# Patient Record
Sex: MALE | Race: BLACK OR AFRICAN AMERICAN | NOT HISPANIC OR LATINO | Employment: FULL TIME | URBAN - METROPOLITAN AREA
[De-identification: names, ages, dates, MRNs, and addresses within clinical notes are randomized per-mention and may not be internally consistent; named-entity substitution may affect disease eponyms.]

---

## 2017-12-10 ENCOUNTER — HOSPITAL ENCOUNTER (EMERGENCY)
Facility: HOSPITAL | Age: 42
Discharge: HOME | End: 2017-12-10
Attending: EMERGENCY MEDICINE | Admitting: EMERGENCY MEDICINE

## 2017-12-10 VITALS
HEART RATE: 58 BPM | HEIGHT: 69 IN | SYSTOLIC BLOOD PRESSURE: 119 MMHG | BODY MASS INDEX: 28.58 KG/M2 | TEMPERATURE: 97.8 F | OXYGEN SATURATION: 94 % | WEIGHT: 193 LBS | DIASTOLIC BLOOD PRESSURE: 80 MMHG | RESPIRATION RATE: 18 BRPM

## 2017-12-10 DIAGNOSIS — R11.2 NAUSEA AND VOMITING, INTRACTABILITY OF VOMITING NOT SPECIFIED, UNSPECIFIED VOMITING TYPE: Primary | ICD-10-CM

## 2017-12-10 LAB
ALBUMIN SERPL-MCNC: 4.1 G/DL (ref 3.5–5.2)
ALBUMIN/GLOB SERPL: 1.3 G/DL
ALP SERPL-CCNC: 51 U/L (ref 39–117)
ALT SERPL W P-5'-P-CCNC: 23 U/L (ref 1–41)
ANION GAP SERPL CALCULATED.3IONS-SCNC: 10.8 MMOL/L
AST SERPL-CCNC: 16 U/L (ref 1–40)
BACTERIA UR QL AUTO: NORMAL /HPF
BASOPHILS # BLD AUTO: 0.04 10*3/MM3 (ref 0–0.2)
BASOPHILS NFR BLD AUTO: 0.5 % (ref 0–1.5)
BILIRUB SERPL-MCNC: 0.8 MG/DL (ref 0.1–1.2)
BILIRUB UR QL STRIP: NEGATIVE
BUN BLD-MCNC: 12 MG/DL (ref 6–20)
BUN/CREAT SERPL: 8.8 (ref 7–25)
CALCIUM SPEC-SCNC: 9.2 MG/DL (ref 8.6–10.5)
CHLORIDE SERPL-SCNC: 106 MMOL/L (ref 98–107)
CLARITY UR: CLEAR
CO2 SERPL-SCNC: 26.2 MMOL/L (ref 22–29)
COLOR UR: YELLOW
CREAT BLD-MCNC: 1.36 MG/DL (ref 0.76–1.27)
DEPRECATED RDW RBC AUTO: 40.1 FL (ref 37–54)
EOSINOPHIL # BLD AUTO: 0.19 10*3/MM3 (ref 0–0.7)
EOSINOPHIL NFR BLD AUTO: 2.6 % (ref 0.3–6.2)
ERYTHROCYTE [DISTWIDTH] IN BLOOD BY AUTOMATED COUNT: 12.6 % (ref 11.5–14.5)
GFR SERPL CREATININE-BSD FRML MDRD: 57 ML/MIN/1.73
GFR SERPL CREATININE-BSD FRML MDRD: 69 ML/MIN/1.73
GLOBULIN UR ELPH-MCNC: 3.2 GM/DL
GLUCOSE BLD-MCNC: 109 MG/DL (ref 65–99)
GLUCOSE UR STRIP-MCNC: NEGATIVE MG/DL
HCT VFR BLD AUTO: 40.9 % (ref 40.4–52.2)
HGB BLD-MCNC: 14.3 G/DL (ref 13.7–17.6)
HGB UR QL STRIP.AUTO: ABNORMAL
HOLD SPECIMEN: NORMAL
HOLD SPECIMEN: NORMAL
HYALINE CASTS UR QL AUTO: NORMAL /LPF
IMM GRANULOCYTES # BLD: 0 10*3/MM3 (ref 0–0.03)
IMM GRANULOCYTES NFR BLD: 0 % (ref 0–0.5)
KETONES UR QL STRIP: NEGATIVE
LEUKOCYTE ESTERASE UR QL STRIP.AUTO: NEGATIVE
LIPASE SERPL-CCNC: 21 U/L (ref 13–60)
LYMPHOCYTES # BLD AUTO: 2.45 10*3/MM3 (ref 0.9–4.8)
LYMPHOCYTES NFR BLD AUTO: 33.2 % (ref 19.6–45.3)
MCH RBC QN AUTO: 30.9 PG (ref 27–32.7)
MCHC RBC AUTO-ENTMCNC: 35 G/DL (ref 32.6–36.4)
MCV RBC AUTO: 88.3 FL (ref 79.8–96.2)
MONOCYTES # BLD AUTO: 0.74 10*3/MM3 (ref 0.2–1.2)
MONOCYTES NFR BLD AUTO: 10 % (ref 5–12)
NEUTROPHILS # BLD AUTO: 3.96 10*3/MM3 (ref 1.9–8.1)
NEUTROPHILS NFR BLD AUTO: 53.7 % (ref 42.7–76)
NITRITE UR QL STRIP: NEGATIVE
PH UR STRIP.AUTO: 5.5 [PH] (ref 5–8)
PLATELET # BLD AUTO: 254 10*3/MM3 (ref 140–500)
PMV BLD AUTO: 11.3 FL (ref 6–12)
POTASSIUM BLD-SCNC: 4.2 MMOL/L (ref 3.5–5.2)
PROT SERPL-MCNC: 7.3 G/DL (ref 6–8.5)
PROT UR QL STRIP: NEGATIVE
RBC # BLD AUTO: 4.63 10*6/MM3 (ref 4.6–6)
RBC # UR: NORMAL /HPF
REF LAB TEST METHOD: NORMAL
SODIUM BLD-SCNC: 143 MMOL/L (ref 136–145)
SP GR UR STRIP: 1.02 (ref 1–1.03)
SQUAMOUS #/AREA URNS HPF: NORMAL /HPF
UROBILINOGEN UR QL STRIP: ABNORMAL
WBC NRBC COR # BLD: 7.38 10*3/MM3 (ref 4.5–10.7)
WBC UR QL AUTO: NORMAL /HPF
WHOLE BLOOD HOLD SPECIMEN: NORMAL
WHOLE BLOOD HOLD SPECIMEN: NORMAL

## 2017-12-10 PROCEDURE — 85025 COMPLETE CBC W/AUTO DIFF WBC: CPT | Performed by: EMERGENCY MEDICINE

## 2017-12-10 PROCEDURE — 81003 URINALYSIS AUTO W/O SCOPE: CPT | Performed by: EMERGENCY MEDICINE

## 2017-12-10 PROCEDURE — 81015 MICROSCOPIC EXAM OF URINE: CPT | Performed by: EMERGENCY MEDICINE

## 2017-12-10 PROCEDURE — 83690 ASSAY OF LIPASE: CPT | Performed by: EMERGENCY MEDICINE

## 2017-12-10 PROCEDURE — 36415 COLL VENOUS BLD VENIPUNCTURE: CPT | Performed by: EMERGENCY MEDICINE

## 2017-12-10 PROCEDURE — 80053 COMPREHEN METABOLIC PANEL: CPT | Performed by: EMERGENCY MEDICINE

## 2017-12-10 PROCEDURE — 99284 EMERGENCY DEPT VISIT MOD MDM: CPT

## 2017-12-10 RX ORDER — SODIUM CHLORIDE 0.9 % (FLUSH) 0.9 %
10 SYRINGE (ML) INJECTION AS NEEDED
Status: DISCONTINUED | OUTPATIENT
Start: 2017-12-10 | End: 2017-12-11 | Stop reason: HOSPADM

## 2017-12-10 RX ORDER — RANITIDINE 150 MG/1
150 TABLET ORAL 2 TIMES DAILY
COMMUNITY
End: 2017-12-18 | Stop reason: ALTCHOICE

## 2017-12-10 RX ORDER — ONDANSETRON 4 MG/1
4 TABLET, ORALLY DISINTEGRATING ORAL EVERY 6 HOURS PRN
Qty: 20 TABLET | Refills: 0 | Status: SHIPPED | OUTPATIENT
Start: 2017-12-10

## 2017-12-10 RX ORDER — ONDANSETRON 4 MG/1
4 TABLET, ORALLY DISINTEGRATING ORAL ONCE
Status: COMPLETED | OUTPATIENT
Start: 2017-12-10 | End: 2017-12-10

## 2017-12-10 RX ADMIN — ONDANSETRON 4 MG: 4 TABLET, ORALLY DISINTEGRATING ORAL at 21:24

## 2017-12-11 NOTE — ED PROVIDER NOTES
EMERGENCY DEPARTMENT ENCOUNTER    CHIEF COMPLAINT  Chief Complaint: vomiting  History given by: patient  History limited by: nothing  Room Number: 12/12  PMD: Provider Not In System      HPI:  Pt is a 42 y.o. male with a h/x of GI issues, who presents complaining of vomiting that began three days ago. He then developed diarrhea. Pt was admitted about one year ago at  for the same symptoms but they could not tell him what the problem was.  Says he has had problems with his stomach for a while.   Pt also c/o of intermittent headaches that are not correlated to his vomiting. Pt denies fever, chest pain, and abdominal pain.    Duration:  Three days  Onset: gradual  Timing: constant  Location: n/a  Radiation: n/a  Quality: n/a  Intensity/Severity: moderate  Progression: unchanged  Associated Symptoms: diarrhea, headaches  Aggravating Factors: none  Alleviating Factors: none  Previous Episodes: Pt was admitted to the hospital for similar symptoms one year ago.  Treatment before arrival: none    PAST MEDICAL HISTORY  Active Ambulatory Problems     Diagnosis Date Noted   • No Active Ambulatory Problems     Resolved Ambulatory Problems     Diagnosis Date Noted   • No Resolved Ambulatory Problems     Past Medical History:   Diagnosis Date   • Vitamin D deficiency disease        PAST SURGICAL HISTORY  Past Surgical History:   Procedure Laterality Date   • COLONOSCOPY     • HERNIA REPAIR         FAMILY HISTORY  History reviewed. No pertinent family history.    SOCIAL HISTORY  Social History     Social History   • Marital status: Single     Spouse name: N/A   • Number of children: N/A   • Years of education: N/A     Occupational History   • Not on file.     Social History Main Topics   • Smoking status: Never Smoker   • Smokeless tobacco: Never Used   • Alcohol use Yes      Comment: occasionally   • Drug use: No   • Sexual activity: Defer     Other Topics Concern   • Not on file     Social History Narrative   • No narrative  on file       ALLERGIES  Review of patient's allergies indicates no known allergies.    REVIEW OF SYSTEMS  Review of Systems   Constitutional: Negative for activity change, appetite change and fever.   HENT: Negative for congestion and sore throat.    Eyes: Negative.    Respiratory: Negative for cough and shortness of breath.    Cardiovascular: Negative for chest pain and leg swelling.   Gastrointestinal: Positive for diarrhea, nausea and vomiting. Negative for abdominal pain.   Endocrine: Negative.    Genitourinary: Negative for decreased urine volume and dysuria.   Musculoskeletal: Negative for neck pain.   Skin: Negative for rash and wound.   Allergic/Immunologic: Negative.    Neurological: Positive for headaches (intermittent). Negative for weakness and numbness.   Hematological: Negative.    Psychiatric/Behavioral: Negative.    All other systems reviewed and are negative.      PHYSICAL EXAM  ED Triage Vitals   Temp Heart Rate Resp BP SpO2   12/10/17 1904 12/10/17 1903 12/10/17 1903 12/10/17 1903 12/10/17 1903   97.8 °F (36.6 °C) 55 17 159/100 98 %      Temp src Heart Rate Source Patient Position BP Location FiO2 (%)   12/10/17 1904 12/10/17 1903 12/10/17 1903 12/10/17 1903 --   Tympanic Monitor Sitting Right arm        Physical Exam   Constitutional: He is oriented to person, place, and time and well-developed, well-nourished, and in no distress.   HENT:   Head: Normocephalic and atraumatic.   Eyes: EOM are normal. Pupils are equal, round, and reactive to light.   Neck: Normal range of motion. Neck supple.   Cardiovascular: Normal rate, regular rhythm and normal heart sounds.    Pulmonary/Chest: Effort normal and breath sounds normal. No respiratory distress.   Abdominal: Soft. There is no tenderness. There is no rebound and no guarding.   Musculoskeletal: Normal range of motion. He exhibits no edema.   Neurological: He is alert and oriented to person, place, and time. He has normal sensation and normal  strength.   Skin: Skin is warm and dry.   Psychiatric: Mood and affect normal.   Nursing note and vitals reviewed.      LAB RESULTS  Lab Results (last 24 hours)     Procedure Component Value Units Date/Time    CBC & Differential [[id number]] Collected:  12/10/17 1911    Specimen:  Blood Updated:  12/10/17 1931    Narrative:       The following orders were created for panel order CBC & Differential.  Procedure                               Abnormality         Status                     ---------                               -----------         ------                     CBC Auto Differential[[id number]]        Normal              Final result                 Please view results for these tests on the individual orders.    Comprehensive Metabolic Panel [[id number]]  (Abnormal) Collected:  12/10/17 1911    Specimen:  Blood Updated:  12/10/17 1944     Glucose 109 (H) mg/dL      BUN 12 mg/dL      Creatinine 1.36 (H) mg/dL      Sodium 143 mmol/L      Potassium 4.2 mmol/L      Chloride 106 mmol/L      CO2 26.2 mmol/L      Calcium 9.2 mg/dL      Total Protein 7.3 g/dL      Albumin 4.10 g/dL      ALT (SGPT) 23 U/L      AST (SGOT) 16 U/L      Alkaline Phosphatase 51 U/L      Total Bilirubin 0.8 mg/dL      eGFR Non African Amer 57 (L) mL/min/1.73      eGFR  African Amer 69 mL/min/1.73      Globulin 3.2 gm/dL      A/G Ratio 1.3 g/dL      BUN/Creatinine Ratio 8.8     Anion Gap 10.8 mmol/L     Lipase [[id number]]  (Normal) Collected:  12/10/17 1911    Specimen:  Blood Updated:  12/10/17 1944     Lipase 21 U/L     CBC Auto Differential [[id number]]  (Normal) Collected:  12/10/17 1911    Specimen:  Blood Updated:  12/10/17 1931     WBC 7.38 10*3/mm3      RBC 4.63 10*6/mm3      Hemoglobin 14.3 g/dL      Hematocrit 40.9 %      MCV 88.3 fL      MCH 30.9 pg      MCHC 35.0 g/dL      RDW 12.6 %      RDW-SD 40.1 fl      MPV 11.3 fL      Platelets 254 10*3/mm3      Neutrophil % 53.7 %      Lymphocyte % 33.2 %      Monocyte % 10.0 %       Eosinophil % 2.6 %      Basophil % 0.5 %      Immature Grans % 0.0 %      Neutrophils, Absolute 3.96 10*3/mm3      Lymphocytes, Absolute 2.45 10*3/mm3      Monocytes, Absolute 0.74 10*3/mm3      Eosinophils, Absolute 0.19 10*3/mm3      Basophils, Absolute 0.04 10*3/mm3      Immature Grans, Absolute 0.00 10*3/mm3     Urinalysis With / Culture If Indicated - Urine, Clean Catch [[id number]]  (Abnormal) Collected:  12/10/17 1958    Specimen:  Urine from Urine, Clean Catch Updated:  12/10/17 2014     Color, UA Yellow     Appearance, UA Clear     pH, UA 5.5     Specific Gravity, UA 1.025     Glucose, UA Negative     Ketones, UA Negative     Bilirubin, UA Negative     Blood, UA Trace (A)     Protein, UA Negative     Leuk Esterase, UA Negative     Nitrite, UA Negative     Urobilinogen, UA 0.2 E.U./dL    Urinalysis, Microscopic Only - Urine, Clean Catch [[id number]] Collected:  12/10/17 1958    Specimen:  Urine from Urine, Clean Catch Updated:  12/10/17 2015     RBC, UA 0-2 /HPF      WBC, UA 0-2 /HPF      Bacteria, UA None Seen /HPF      Squamous Epithelial Cells, UA 0-2 /HPF      Hyaline Casts, UA 0-2 /LPF      Methodology Automated Microscopy          I ordered the above labs and reviewed the results      PROCEDURES  Procedures      PROGRESS AND CONSULTS  ED Course     1907  Ordered labs for further evaluation.     2124  Ordered Zofran for nausea.    2156  Rechecked the patient and he is resting comfortably. Discussed the patient's pertinent labs and imaging results, including unremarkable labs. Discussed plan to discharge the patient home with a prx for anti-emetics and recommended follow up with a GI physician as soon as possible. Patient agrees with the plan and all questions were addressed.     Latest vital signs   BP- 134/85 HR- 52 Temp- 97.8 °F (36.6 °C) (Tympanic) O2 sat- 95%      MEDICAL DECISION MAKING  Results were reviewed/discussed with the patient and they were also made aware of online access. Pt also made  aware that some labs, such as cultures, will not be resulted during ER visit and follow up with PMD is necessary.     MDM  Number of Diagnoses or Management Options     Amount and/or Complexity of Data Reviewed  Clinical lab tests: ordered and reviewed (Labs are unremarkable.)           DIAGNOSIS  Final diagnoses:   Nausea and vomiting, intractability of vomiting not specified, unspecified vomiting type       DISPOSITION  DISCHARGE    Patient discharged in stable condition.    Reviewed implications of results, diagnosis, meds, responsibility to follow up, warning signs and symptoms of possible worsening, potential complications and reasons to return to ER, including new or worsening symptoms.    Patient/Family voiced understanding of above instructions.    Discussed plan for discharge, as there is no emergent indication for admission.  Pt/family is agreeable and understands need for follow up and repeat testing.  Pt is aware that discharge does not mean that nothing is wrong but it indicates no emergency is present that requires admission and they must continue care with follow-up as given below or physician of their choice.     FOLLOW-UP  [facility] REFERRAL SERVICE  [place] [address]  [phone]  Schedule an appointment as soon as possible for a visit      Candice Kelsey MD  [facility] [place] [address]  [phone]    Schedule an appointment as soon as possible for a visit           Medication List      New Prescriptions          ondansetron ODT 4 MG disintegrating tablet   Commonly known as:  ZOFRAN-ODT   Take 1 tablet by mouth Every 6 (Six) Hours As Needed for Nausea or   Vomiting.               Latest Documented Vital Signs:  As of 9:59 PM  BP- 134/85 HR- 52 Temp- 97.8 °F (36.6 °C) (Tympanic) O2 sat- 95%    --  Documentation assistance provided by bijal Bennett for Dr. Nugent.  Information recorded by the bijal was done at my direction and has been  verified and validated by me.         Adela Bennett  12/10/17 7430       Arben Nugent MD  12/11/17 3405

## 2017-12-18 ENCOUNTER — OFFICE VISIT (OUTPATIENT)
Dept: GASTROENTEROLOGY | Facility: CLINIC | Age: 42
End: 2017-12-18

## 2017-12-18 ENCOUNTER — TELEPHONE (OUTPATIENT)
Dept: GASTROENTEROLOGY | Facility: CLINIC | Age: 42
End: 2017-12-18

## 2017-12-18 VITALS — HEIGHT: 69 IN | WEIGHT: 204 LBS | BODY MASS INDEX: 30.21 KG/M2

## 2017-12-18 DIAGNOSIS — R68.81 EARLY SATIETY: ICD-10-CM

## 2017-12-18 DIAGNOSIS — R19.7 DIARRHEA OF PRESUMED INFECTIOUS ORIGIN: ICD-10-CM

## 2017-12-18 DIAGNOSIS — R10.13 EPIGASTRIC PAIN: Primary | ICD-10-CM

## 2017-12-18 DIAGNOSIS — R11.2 NON-INTRACTABLE VOMITING WITH NAUSEA, UNSPECIFIED VOMITING TYPE: ICD-10-CM

## 2017-12-18 PROCEDURE — 99204 OFFICE O/P NEW MOD 45 MIN: CPT | Performed by: INTERNAL MEDICINE

## 2017-12-18 RX ORDER — FINASTERIDE 5 MG/1
TABLET, FILM COATED ORAL
Refills: 5 | COMMUNITY
Start: 2017-12-12

## 2017-12-18 RX ORDER — PANTOPRAZOLE SODIUM 40 MG/1
40 TABLET, DELAYED RELEASE ORAL
Qty: 30 TABLET | Refills: 2 | Status: SHIPPED | OUTPATIENT
Start: 2017-12-18

## 2017-12-18 NOTE — PROGRESS NOTES
"Chief Complaint   Patient presents with   • Diarrhea     er visit told him to follow up w/ GI   • Vomiting       Subjective     HPI    Frankie Jerez is a 42 y.o. male with a past medical history noted below who presents for evaluation of vomiting, abdominal pain,  and diarrhea.  Symptoms have been intermittent but present for 3-4 years.  Most recent episode was last week--  He went to the ER 12/10.  He was having persistent nausea with \"forceful vomiting\" for about 3 days.  This was associated with diarrhea, which was a new change.  He has pain in his epigastric region that doubles him over-- feels like he needs to burp but can't.  He tries to \"push\" the air out.  He has been on zofran and zantac.  He felt that the zantac initially helped but doesn't anymore.   Vomiting has resolved but he persists with feeling overly full.  He says that he eats a small amount it makes him feel very full when he used to be able to eat more.  He is not having any associated vomiting since the initial episode.  The diarrhea has resolved as well.  He has not lost any weight.    He finds that corn makes his symptoms worse but that has been the only food trigger that he has been able to identify and he does avoid this.  His last episode was in September but reportedly not as bad.     He was hospitalized at  in 2015 for similar symptoms. He reports that he had CT scans, an EGD and colonoscopy but did not get any diagnosis he can recall..  He says they put him on a clear liquid diet did not see any polyps on his colonoscopy nor any abnormality on his EGD.    No family history of similar symptoms, no GI malignancy in his family.  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  No smoking.  Social ETOH.  No marijuana use.  Excess NSAIDs.  He does report that he is getting headaches and has been taking some Tylenol.    Labs reviewed from the December 10 ER visit show normal CBC, and elevated creatinine at 1.3.        Past Medical History: "   Diagnosis Date   • Vitamin D deficiency disease          Current Outpatient Prescriptions:   •  Cholecalciferol (VITAMIN D3) 5000 units capsule capsule, Take 5,000 Units by mouth 1 (One) Time Per Week., Disp: , Rfl:   •  finasteride (PROSCAR) 5 MG tablet, , Disp: , Rfl: 5  •  ondansetron ODT (ZOFRAN-ODT) 4 MG disintegrating tablet, Take 1 tablet by mouth Every 6 (Six) Hours As Needed for Nausea or Vomiting., Disp: 20 tablet, Rfl: 0  •  pantoprazole (PROTONIX) 40 MG EC tablet, Take 1 tablet by mouth Every Morning Before Breakfast., Disp: 30 tablet, Rfl: 2    No Known Allergies    Social History     Social History   • Marital status: Single     Spouse name: N/A   • Number of children: N/A   • Years of education: N/A     Occupational History   • Not on file.     Social History Main Topics   • Smoking status: Never Smoker   • Smokeless tobacco: Never Used   • Alcohol use Yes      Comment: occasionally   • Drug use: No   • Sexual activity: Defer     Other Topics Concern   • Not on file     Social History Narrative       History reviewed. No pertinent family history.    Review of Systems   Constitutional: Negative for activity change, appetite change and fatigue.   HENT: Negative for sore throat and trouble swallowing.    Respiratory: Negative.    Cardiovascular: Negative.    Gastrointestinal: Positive for abdominal pain, diarrhea and nausea. Negative for abdominal distention and blood in stool.   Endocrine: Negative for cold intolerance and heat intolerance.   Genitourinary: Negative for difficulty urinating, dysuria and frequency.   Musculoskeletal: Negative for arthralgias, back pain and myalgias.   Skin: Negative.    Hematological: Negative for adenopathy. Does not bruise/bleed easily.   All other systems reviewed and are negative.      Objective     There were no vitals filed for this visit.  Last 2 weights    12/18/17  0956   Weight: 92.5 kg (204 lb)     Body mass index is 30.13 kg/(m^2).    Physical Exam    Constitutional: He is oriented to person, place, and time. He appears well-developed and well-nourished. No distress.   HENT:   Head: Normocephalic and atraumatic.   Right Ear: External ear normal.   Left Ear: External ear normal.   Nose: Nose normal.   Mouth/Throat: Oropharynx is clear and moist.   Eyes: Conjunctivae and EOM are normal. Right eye exhibits no discharge. Left eye exhibits no discharge. No scleral icterus.   Neck: Normal range of motion. Neck supple. No thyromegaly present.   No supraclavicular adenopathy   Cardiovascular: Normal rate, regular rhythm, normal heart sounds and intact distal pulses.  Exam reveals no gallop.    No murmur heard.  No lower extremity edema   Pulmonary/Chest: Effort normal and breath sounds normal. No respiratory distress. He has no wheezes.   Abdominal: Soft. Normal appearance and bowel sounds are normal. He exhibits no distension and no mass. There is no hepatosplenomegaly. There is tenderness. There is no rigidity, no rebound and no guarding.   TTP epigastrium   Genitourinary:   Genitourinary Comments: Rectal exam deferred   Musculoskeletal: Normal range of motion. He exhibits no edema or tenderness.   No atrophy of upper or lower extremities.  Normal digits and nails of both hands.   Lymphadenopathy:     He has no cervical adenopathy.   Neurological: He is alert and oriented to person, place, and time. He displays no atrophy. Coordination normal.   Skin: Skin is warm and dry. No rash noted. He is not diaphoretic. No erythema.   Psychiatric: He has a normal mood and affect. His behavior is normal. Judgment and thought content normal.   Vitals reviewed.      WBC   Date Value Ref Range Status   12/10/2017 7.38 4.50 - 10.70 10*3/mm3 Final     RBC   Date Value Ref Range Status   12/10/2017 4.63 4.60 - 6.00 10*6/mm3 Final     Hemoglobin   Date Value Ref Range Status   12/10/2017 14.3 13.7 - 17.6 g/dL Final     Hematocrit   Date Value Ref Range Status   12/10/2017 40.9 40.4 -  52.2 % Final     MCV   Date Value Ref Range Status   12/10/2017 88.3 79.8 - 96.2 fL Final     MCH   Date Value Ref Range Status   12/10/2017 30.9 27.0 - 32.7 pg Final     MCHC   Date Value Ref Range Status   12/10/2017 35.0 32.6 - 36.4 g/dL Final     RDW   Date Value Ref Range Status   12/10/2017 12.6 11.5 - 14.5 % Final     RDW-SD   Date Value Ref Range Status   12/10/2017 40.1 37.0 - 54.0 fl Final     MPV   Date Value Ref Range Status   12/10/2017 11.3 6.0 - 12.0 fL Final     Platelets   Date Value Ref Range Status   12/10/2017 254 140 - 500 10*3/mm3 Final     Neutrophil %   Date Value Ref Range Status   12/10/2017 53.7 42.7 - 76.0 % Final     Lymphocyte %   Date Value Ref Range Status   12/10/2017 33.2 19.6 - 45.3 % Final     Monocyte %   Date Value Ref Range Status   12/10/2017 10.0 5.0 - 12.0 % Final     Eosinophil %   Date Value Ref Range Status   12/10/2017 2.6 0.3 - 6.2 % Final     Basophil %   Date Value Ref Range Status   12/10/2017 0.5 0.0 - 1.5 % Final     Immature Grans %   Date Value Ref Range Status   12/10/2017 0.0 0.0 - 0.5 % Final     Neutrophils, Absolute   Date Value Ref Range Status   12/10/2017 3.96 1.90 - 8.10 10*3/mm3 Final     Lymphocytes, Absolute   Date Value Ref Range Status   12/10/2017 2.45 0.90 - 4.80 10*3/mm3 Final     Monocytes, Absolute   Date Value Ref Range Status   12/10/2017 0.74 0.20 - 1.20 10*3/mm3 Final     Eosinophils, Absolute   Date Value Ref Range Status   12/10/2017 0.19 0.00 - 0.70 10*3/mm3 Final     Basophils, Absolute   Date Value Ref Range Status   12/10/2017 0.04 0.00 - 0.20 10*3/mm3 Final     Immature Grans, Absolute   Date Value Ref Range Status   12/10/2017 0.00 0.00 - 0.03 10*3/mm3 Final       Glucose   Date Value Ref Range Status   12/10/2017 109 (H) 65 - 99 mg/dL Final     Sodium   Date Value Ref Range Status   12/10/2017 143 136 - 145 mmol/L Final     Potassium   Date Value Ref Range Status   12/10/2017 4.2 3.5 - 5.2 mmol/L Final     CO2   Date Value Ref  Range Status   12/10/2017 26.2 22.0 - 29.0 mmol/L Final     Chloride   Date Value Ref Range Status   12/10/2017 106 98 - 107 mmol/L Final     Anion Gap   Date Value Ref Range Status   12/10/2017 10.8 mmol/L Final     Creatinine   Date Value Ref Range Status   12/10/2017 1.36 (H) 0.76 - 1.27 mg/dL Final     BUN   Date Value Ref Range Status   12/10/2017 12 6 - 20 mg/dL Final     BUN/Creatinine Ratio   Date Value Ref Range Status   12/10/2017 8.8 7.0 - 25.0 Final     Calcium   Date Value Ref Range Status   12/10/2017 9.2 8.6 - 10.5 mg/dL Final     eGFR Non  Amer   Date Value Ref Range Status   12/10/2017 57 (L) >60 mL/min/1.73 Final     Alkaline Phosphatase   Date Value Ref Range Status   12/10/2017 51 39 - 117 U/L Final     Total Protein   Date Value Ref Range Status   12/10/2017 7.3 6.0 - 8.5 g/dL Final     ALT (SGPT)   Date Value Ref Range Status   12/10/2017 23 1 - 41 U/L Final     AST (SGOT)   Date Value Ref Range Status   12/10/2017 16 1 - 40 U/L Final     Total Bilirubin   Date Value Ref Range Status   12/10/2017 0.8 0.1 - 1.2 mg/dL Final     Albumin   Date Value Ref Range Status   12/10/2017 4.10 3.50 - 5.20 g/dL Final     Globulin   Date Value Ref Range Status   12/10/2017 3.2 gm/dL Final     A/G Ratio   Date Value Ref Range Status   12/10/2017 1.3 g/dL Final         Imaging Results (last 7 days)     ** No results found for the last 168 hours. **            No notes on file    Assessment/Plan    Epigastric pain: burning discomfort, previously relieved with ranitidine.  ? GERD/dyspepsia/esophagitis      Early satiety: associated with above, persisting      Non-intractable vomiting with nausea, unspecified vomiting type: intermittent episodes, unclear precipitants.  ? Cyclic vomiting syndrome vs gastroparesis vs GERD      Diarrhea of presumed infectious origin: ? Acute GE causing most recent symptoms, now resolved    Plan  -start daily pantoprazole  -Upper GI to rule out obstruction, hernia  -will get  records from his previous hospitalization  -consider repeat EGD vs gastric emptying study      Frankie was seen today for diarrhea and vomiting.    Diagnoses and all orders for this visit:    Epigastric pain  -     pantoprazole (PROTONIX) 40 MG EC tablet; Take 1 tablet by mouth Every Morning Before Breakfast.  -     FL Upper GI Single Contrast With KUB    Early satiety  -     FL Upper GI Single Contrast With KUB    Non-intractable vomiting with nausea, unspecified vomiting type  -     pantoprazole (PROTONIX) 40 MG EC tablet; Take 1 tablet by mouth Every Morning Before Breakfast.  -     FL Upper GI Single Contrast With KUB    Diarrhea of presumed infectious origin        I have discussed the above plan with the patient.  They verbalize understanding and are in agreement with the plan.  They have been advised to contact the office for any questions, concerns, or changes related to their health.    Dictated utilizing Dragon dictation

## 2017-12-18 NOTE — TELEPHONE ENCOUNTER
Call to  [facility] @ [phone] and spoke with Stephanie.  Per instructions, fax request for records to  - confirmation received.

## 2017-12-18 NOTE — PATIENT INSTRUCTIONS
Start the pantoprazole     Schedule the upper GI test    I will get your records from [facility]    For any additional questions, concerns or changes to your condition after today's office visit please contact the office at [phone].

## 2017-12-18 NOTE — TELEPHONE ENCOUNTER
----- Message from Candice Kelsey MD sent at 12/18/2017 12:18 PM EST -----  He was hospitalized at  in 2015 and reports workup for abdominal pain.  Can we get those records, including any imaging, endoscopy results?  thx

## 2017-12-22 NOTE — TELEPHONE ENCOUNTER
Call to  and spoke with Mariya.  She instructs to refax record request to .  Did so - confirmation received.

## 2018-01-01 ENCOUNTER — HOSPITAL ENCOUNTER (EMERGENCY)
Facility: HOSPITAL | Age: 43
Discharge: HOME | End: 2018-01-01
Attending: EMERGENCY MEDICINE | Admitting: EMERGENCY MEDICINE

## 2018-01-01 VITALS
HEIGHT: 69 IN | TEMPERATURE: 97.7 F | WEIGHT: 204 LBS | SYSTOLIC BLOOD PRESSURE: 111 MMHG | RESPIRATION RATE: 17 BRPM | DIASTOLIC BLOOD PRESSURE: 78 MMHG | OXYGEN SATURATION: 95 % | BODY MASS INDEX: 30.21 KG/M2 | HEART RATE: 55 BPM

## 2018-01-01 DIAGNOSIS — R10.13 ABDOMINAL PAIN, CHRONIC, EPIGASTRIC: Primary | ICD-10-CM

## 2018-01-01 DIAGNOSIS — G89.29 ABDOMINAL PAIN, CHRONIC, EPIGASTRIC: Primary | ICD-10-CM

## 2018-01-01 LAB
ALBUMIN SERPL-MCNC: 4.1 G/DL (ref 3.5–5.2)
ALBUMIN/GLOB SERPL: 1.3 G/DL
ALP SERPL-CCNC: 56 U/L (ref 39–117)
ALT SERPL W P-5'-P-CCNC: 43 U/L (ref 1–41)
ANION GAP SERPL CALCULATED.3IONS-SCNC: 10.2 MMOL/L
AST SERPL-CCNC: 21 U/L (ref 1–40)
BASOPHILS # BLD AUTO: 0.02 10*3/MM3 (ref 0–0.2)
BASOPHILS NFR BLD AUTO: 0.2 % (ref 0–1.5)
BILIRUB SERPL-MCNC: 0.4 MG/DL (ref 0.1–1.2)
BILIRUB UR QL STRIP: NEGATIVE
BUN BLD-MCNC: 11 MG/DL (ref 6–20)
BUN/CREAT SERPL: 9.8 (ref 7–25)
CALCIUM SPEC-SCNC: 9.3 MG/DL (ref 8.6–10.5)
CHLORIDE SERPL-SCNC: 103 MMOL/L (ref 98–107)
CLARITY UR: CLEAR
CO2 SERPL-SCNC: 26.8 MMOL/L (ref 22–29)
COLOR UR: YELLOW
CREAT BLD-MCNC: 1.12 MG/DL (ref 0.76–1.27)
DEPRECATED RDW RBC AUTO: 39.5 FL (ref 37–54)
EOSINOPHIL # BLD AUTO: 0.19 10*3/MM3 (ref 0–0.7)
EOSINOPHIL NFR BLD AUTO: 2.2 % (ref 0.3–6.2)
ERYTHROCYTE [DISTWIDTH] IN BLOOD BY AUTOMATED COUNT: 12.3 % (ref 11.5–14.5)
GFR SERPL CREATININE-BSD FRML MDRD: 86 ML/MIN/1.73
GLOBULIN UR ELPH-MCNC: 3.2 GM/DL
GLUCOSE BLD-MCNC: 138 MG/DL (ref 65–99)
GLUCOSE UR STRIP-MCNC: NEGATIVE MG/DL
HCT VFR BLD AUTO: 39.4 % (ref 40.4–52.2)
HGB BLD-MCNC: 13.7 G/DL (ref 13.7–17.6)
HGB UR QL STRIP.AUTO: NEGATIVE
IMM GRANULOCYTES # BLD: 0 10*3/MM3 (ref 0–0.03)
IMM GRANULOCYTES NFR BLD: 0 % (ref 0–0.5)
KETONES UR QL STRIP: NEGATIVE
LEUKOCYTE ESTERASE UR QL STRIP.AUTO: NEGATIVE
LIPASE SERPL-CCNC: 20 U/L (ref 13–60)
LYMPHOCYTES # BLD AUTO: 1.67 10*3/MM3 (ref 0.9–4.8)
LYMPHOCYTES NFR BLD AUTO: 19.4 % (ref 19.6–45.3)
MCH RBC QN AUTO: 30.7 PG (ref 27–32.7)
MCHC RBC AUTO-ENTMCNC: 34.8 G/DL (ref 32.6–36.4)
MCV RBC AUTO: 88.3 FL (ref 79.8–96.2)
MONOCYTES # BLD AUTO: 0.74 10*3/MM3 (ref 0.2–1.2)
MONOCYTES NFR BLD AUTO: 8.6 % (ref 5–12)
NEUTROPHILS # BLD AUTO: 6.01 10*3/MM3 (ref 1.9–8.1)
NEUTROPHILS NFR BLD AUTO: 69.6 % (ref 42.7–76)
NITRITE UR QL STRIP: NEGATIVE
PH UR STRIP.AUTO: 7 [PH] (ref 5–8)
PLATELET # BLD AUTO: 234 10*3/MM3 (ref 140–500)
PMV BLD AUTO: 11.5 FL (ref 6–12)
POTASSIUM BLD-SCNC: 4.1 MMOL/L (ref 3.5–5.2)
PROT SERPL-MCNC: 7.3 G/DL (ref 6–8.5)
PROT UR QL STRIP: NEGATIVE
RBC # BLD AUTO: 4.46 10*6/MM3 (ref 4.6–6)
SODIUM BLD-SCNC: 140 MMOL/L (ref 136–145)
SP GR UR STRIP: 1.02 (ref 1–1.03)
UROBILINOGEN UR QL STRIP: NORMAL
WBC NRBC COR # BLD: 8.63 10*3/MM3 (ref 4.5–10.7)

## 2018-01-01 PROCEDURE — 99284 EMERGENCY DEPT VISIT MOD MDM: CPT

## 2018-01-01 PROCEDURE — 96375 TX/PRO/DX INJ NEW DRUG ADDON: CPT

## 2018-01-01 PROCEDURE — 83690 ASSAY OF LIPASE: CPT | Performed by: PHYSICIAN ASSISTANT

## 2018-01-01 PROCEDURE — 25010000002 ONDANSETRON PER 1 MG: Performed by: PHYSICIAN ASSISTANT

## 2018-01-01 PROCEDURE — 85025 COMPLETE CBC W/AUTO DIFF WBC: CPT | Performed by: PHYSICIAN ASSISTANT

## 2018-01-01 PROCEDURE — 25010000002 MORPHINE PER 10 MG: Performed by: EMERGENCY MEDICINE

## 2018-01-01 PROCEDURE — 96361 HYDRATE IV INFUSION ADD-ON: CPT

## 2018-01-01 PROCEDURE — 96374 THER/PROPH/DIAG INJ IV PUSH: CPT

## 2018-01-01 PROCEDURE — 80053 COMPREHEN METABOLIC PANEL: CPT | Performed by: PHYSICIAN ASSISTANT

## 2018-01-01 PROCEDURE — 81003 URINALYSIS AUTO W/O SCOPE: CPT | Performed by: PHYSICIAN ASSISTANT

## 2018-01-01 RX ORDER — FAMOTIDINE 20 MG/1
20 TABLET, FILM COATED ORAL 2 TIMES DAILY
Qty: 14 TABLET | Refills: 0 | Status: SHIPPED | OUTPATIENT
Start: 2018-01-01

## 2018-01-01 RX ORDER — ONDANSETRON 2 MG/ML
4 INJECTION INTRAMUSCULAR; INTRAVENOUS ONCE
Status: COMPLETED | OUTPATIENT
Start: 2018-01-01 | End: 2018-01-01

## 2018-01-01 RX ORDER — FAMOTIDINE 10 MG/ML
20 INJECTION, SOLUTION INTRAVENOUS ONCE
Status: COMPLETED | OUTPATIENT
Start: 2018-01-01 | End: 2018-01-01

## 2018-01-01 RX ADMIN — MORPHINE SULFATE 4 MG: 10 INJECTION, SOLUTION INTRAMUSCULAR; INTRAVENOUS at 07:21

## 2018-01-01 RX ADMIN — FAMOTIDINE 20 MG: 10 INJECTION, SOLUTION INTRAVENOUS at 08:43

## 2018-01-01 RX ADMIN — ONDANSETRON 4 MG: 2 INJECTION INTRAMUSCULAR; INTRAVENOUS at 07:19

## 2018-01-01 RX ADMIN — SODIUM CHLORIDE 1000 ML: 9 INJECTION, SOLUTION INTRAVENOUS at 07:17

## 2018-01-01 NOTE — DISCHARGE INSTRUCTIONS
Avoid dietary triggers like alcohol, caffeine, chocolate, spicy and tomato-based foods.  Avoid NSAIDS like ibuprofen, naproxen, and aspirin.  Follow up as instructed with PCP group listed above or one of your choice as well as your GI doctor and on Jan. 3rd for your scheduled test.  Take the medications as prescribed.  Return to the ER for severe pain, intractable vomiting, fever >100.4, new or worsening symptoms, any concerns.

## 2018-01-01 NOTE — ED PROVIDER NOTES
"EMERGENCY DEPARTMENT ENCOUNTER    Room Number:  11/11  Date seen:  1/1/2018  Time seen: 6:48 AM  PCP: No Known Provider    HPI:  Chief complaint: abdominal pain.   Context:Frankie Jerez is a 42 y.o. male who presents to the ED with c/o acute on chronic intermittent abdominal pain that began last night around 2300. Pt advised that last night he could not get rid of the pain or nausea. Pt has associated nausea w/o vomiting. He denies fever, urinary complaints, diarrhea. Pain is not exertional, radiating, or positional, but is worse with eating. Pt describes the pain as his stomach is \"twisting.\" Pt reports his pain is a 10/10. He is due to see CINDY Davis, on 01/03/18. Pt had a colonoscopy and EDG 2-3 years ago w/o any significant finds. Pt recently started on Protonix. Pt drinks ETOH occasionally and states he has not had any in over 2 weeks.    Onset: gradual  Location: epigastric  Radiation: none  Duration: acute on chronic  Timing: intermittent  Character: \"twisting\"  Aggravating Factors: eating/drinking  Alleviating Factors: none  Severity: moderate     MEDICAL RECORD REVIEW  ER Visit 12/10/17 for N/V. He had unremarkable labs and was prescribed Zofran. Pt reported at the time he was admitted about one year ago to  for similar sx. 12/18/17 Had an office visit with CINDY Davis. She started him on Protonix and ordered an upper GI w/ contrast which is scheduled for 1/3/18.    ALLERGIES  Review of patient's allergies indicates no known allergies.    PAST MEDICAL HISTORY  Active Ambulatory Problems     Diagnosis Date Noted   • No Active Ambulatory Problems     Resolved Ambulatory Problems     Diagnosis Date Noted   • No Resolved Ambulatory Problems     Past Medical History:   Diagnosis Date   • Vitamin D deficiency disease        PAST SURGICAL HISTORY  Past Surgical History:   Procedure Laterality Date   • COLONOSCOPY  2015    Normal per pt.   • HERNIA REPAIR         FAMILY HISTORY  History reviewed. No " pertinent family history.    SOCIAL HISTORY  Social History     Social History   • Marital status: Single     Spouse name: N/A   • Number of children: N/A   • Years of education: N/A     Occupational History   • Not on file.     Social History Main Topics   • Smoking status: Never Smoker   • Smokeless tobacco: Never Used   • Alcohol use Yes      Comment: occasionally   • Drug use: No   • Sexual activity: Defer     Other Topics Concern   • Not on file     Social History Narrative       REVIEW OF SYSTEMS  Review of Systems   Constitutional: Negative for chills and fever.   HENT: Negative.    Eyes: Negative.    Respiratory: Negative for shortness of breath.    Cardiovascular: Negative for chest pain.   Gastrointestinal: Positive for abdominal pain and nausea.   Genitourinary: Negative.    Musculoskeletal: Negative.    Skin: Negative.    Neurological: Negative.    Psychiatric/Behavioral: Negative.        PHYSICAL EXAM  ED Triage Vitals   Temp Heart Rate Resp BP SpO2   01/01/18 0321 01/01/18 0321 01/01/18 0321 01/01/18 0325 01/01/18 0321   98.6 °F (37 °C) 74 18 135/88 95 %      Temp src Heart Rate Source Patient Position BP Location FiO2 (%)   01/01/18 0321 01/01/18 0321 -- -- --   Tympanic Monitor        Physical Exam   Constitutional: He is oriented to person, place, and time and well-developed, well-nourished, and in no distress. No distress.   HENT:   Head: Normocephalic and atraumatic.   Right Ear: External ear normal.   Left Ear: External ear normal.   Nose: Nose normal.   Eyes: Conjunctivae are normal.   Neck: Normal range of motion.   Cardiovascular: Normal rate and regular rhythm.    Pulmonary/Chest: Effort normal and breath sounds normal.   Abdominal:   Normal bowel sounds  Soft  Epigastric tenderness  Nondistended  No rebound, guarding, or rigidity  No appreciable organomegaly  No CVA tenderness   Musculoskeletal: Normal range of motion.   Neurological: He is alert and oriented to person, place, and time.    Skin: Skin is warm and dry.   Psychiatric: Affect normal.   Nursing note and vitals reviewed.      LAB RESULTS  Recent Results (from the past 24 hour(s))   Comprehensive Metabolic Panel    Collection Time: 01/01/18  7:17 AM   Result Value Ref Range    Glucose 138 (H) 65 - 99 mg/dL    BUN 11 6 - 20 mg/dL    Creatinine 1.12 0.76 - 1.27 mg/dL    Sodium 140 136 - 145 mmol/L    Potassium 4.1 3.5 - 5.2 mmol/L    Chloride 103 98 - 107 mmol/L    CO2 26.8 22.0 - 29.0 mmol/L    Calcium 9.3 8.6 - 10.5 mg/dL    Total Protein 7.3 6.0 - 8.5 g/dL    Albumin 4.10 3.50 - 5.20 g/dL    ALT (SGPT) 43 (H) 1 - 41 U/L    AST (SGOT) 21 1 - 40 U/L    Alkaline Phosphatase 56 39 - 117 U/L    Total Bilirubin 0.4 0.1 - 1.2 mg/dL    eGFR  African Amer 86 >60 mL/min/1.73    Globulin 3.2 gm/dL    A/G Ratio 1.3 g/dL    BUN/Creatinine Ratio 9.8 7.0 - 25.0    Anion Gap 10.2 mmol/L   Lipase    Collection Time: 01/01/18  7:17 AM   Result Value Ref Range    Lipase 20 13 - 60 U/L   CBC Auto Differential    Collection Time: 01/01/18  7:17 AM   Result Value Ref Range    WBC 8.63 4.50 - 10.70 10*3/mm3    RBC 4.46 (L) 4.60 - 6.00 10*6/mm3    Hemoglobin 13.7 13.7 - 17.6 g/dL    Hematocrit 39.4 (L) 40.4 - 52.2 %    MCV 88.3 79.8 - 96.2 fL    MCH 30.7 27.0 - 32.7 pg    MCHC 34.8 32.6 - 36.4 g/dL    RDW 12.3 11.5 - 14.5 %    RDW-SD 39.5 37.0 - 54.0 fl    MPV 11.5 6.0 - 12.0 fL    Platelets 234 140 - 500 10*3/mm3    Neutrophil % 69.6 42.7 - 76.0 %    Lymphocyte % 19.4 (L) 19.6 - 45.3 %    Monocyte % 8.6 5.0 - 12.0 %    Eosinophil % 2.2 0.3 - 6.2 %    Basophil % 0.2 0.0 - 1.5 %    Immature Grans % 0.0 0.0 - 0.5 %    Neutrophils, Absolute 6.01 1.90 - 8.10 10*3/mm3    Lymphocytes, Absolute 1.67 0.90 - 4.80 10*3/mm3    Monocytes, Absolute 0.74 0.20 - 1.20 10*3/mm3    Eosinophils, Absolute 0.19 0.00 - 0.70 10*3/mm3    Basophils, Absolute 0.02 0.00 - 0.20 10*3/mm3    Immature Grans, Absolute 0.00 0.00 - 0.03 10*3/mm3   Urinalysis With / Culture If Indicated - Urine,  "Clean Catch    Collection Time: 01/01/18  7:21 AM   Result Value Ref Range    Color, UA Yellow Yellow, Straw    Appearance, UA Clear Clear    pH, UA 7.0 5.0 - 8.0    Specific Gravity, UA 1.024 1.005 - 1.030    Glucose, UA Negative Negative    Ketones, UA Negative Negative    Bilirubin, UA Negative Negative    Blood, UA Negative Negative    Protein, UA Negative Negative    Leuk Esterase, UA Negative Negative    Nitrite, UA Negative Negative    Urobilinogen, UA 1.0 E.U./dL 0.2 - 1.0 E.U./dL       I ordered the above labs and reviewed the results    MEDICATIONS GIVEN IN ER  Medications   sodium chloride 0.9 % bolus 1,000 mL (0 mL Intravenous Stopped 1/1/18 0843)   ondansetron (ZOFRAN) injection 4 mg (4 mg Intravenous Given 1/1/18 0719)   morphine injection 4 mg (4 mg Intravenous Given 1/1/18 0721)   famotidine (PEPCID) injection 20 mg (20 mg Intravenous Given 1/1/18 0843)     PROCEDURES  Procedures      PROGRESS AND CONSULTS    Progress Notes:    ED Course   0700  Morphine and Zofran ordered for pain and nausea. IVF ordered for hydration.    0845  Reviewed pt's history and workup with Dr. Adair.  After a bedside evaluation; Dr Adair agrees with the plan of care    0911  BP- 116/72 HR- 55 Temp- 98.6 °F (37 °C) (Tympanic) O2 sat- 95%  Rechecked the patient who is in NAD and is resting comfortably. Discussed labs and exam indicating nothing acute and the plan to d/c with Pepcid. Pt instructed to keep his appointment and to follow up with Dr. Kelsey. Abdomen remains soft and nondistended on repeat examination.    Disposition vitals:  /71 (Patient Position: Sitting)  Pulse 55  Temp 98.6 °F (37 °C) (Tympanic)   Resp 16  Ht 175.3 cm (69\")  Wt 92.5 kg (204 lb)  SpO2 94%  BMI 30.13 kg/m2      DIAGNOSIS  Final diagnoses:   Abdominal pain, chronic, epigastric       FOLLOW UP   [facility] PHYSICAN REFERRAL SERVICE  [facility] [id number] [phone]  Schedule an appointment as soon as possible " for a visit in 2 days      Candice Kelsey MD  [id number]  [place] [id number]  [phone]    Schedule an appointment as soon as possible for a visit        RX     Medication List      New Prescriptions          famotidine 20 MG tablet   Commonly known as:  PEPCID   Take 1 tablet by mouth 2 (Two) Times a Day.         Documentation assistance provided by bijal Ohara for Faith Duffy PA-C.  Information recorded by the scribe was done at my direction and has been verified and validated by me.     Roseanna Ohara  01/01/18 1000       GREG Bagley  01/01/18 [id number]

## 2018-01-01 NOTE — ED PROVIDER NOTES
Pt presents complaining of abd pain worsened with food intake onset a couple years ago worsening PTA. He also complains of nausea but denies vomiting, urinary symptoms, or any other symptoms at this time.    On exam:  Heart: RRR without murmur  Lungs: CTAB without respiratory distress  Abd: soft with mild epigastric tenderness    I reviewed pt's lab results which show negative WBC and UA. Pt understands and agrees with the plan. All questions answered.    I supervised care provided by the midlevel provider.    We have discussed this patient's history, physical exam, and treatment plan.   I have reviewed the note and personally saw and examined the patient and agree with the plan of care.  Documentation assistance provided by bijal Callaway for Dr. Adair.  Information recorded by the scribe was done at my direction and has been verified and validated by me.     Nura Callaway  01/01/18 [id number]       James Adair MD  01/01/18 5876

## 2018-01-02 ENCOUNTER — TELEPHONE (OUTPATIENT)
Dept: SOCIAL WORK | Facility: HOSPITAL | Age: 43
End: 2018-01-02

## 2018-01-02 NOTE — TELEPHONE ENCOUNTER
Arranged a PCP aapt for pt through [facility] [place] [facility] [place]. Schroeder, Jan 31, 2018 @ 2:30. All info given to pt for appt and verbalized understanding. Opal BERMAN

## 2018-01-03 ENCOUNTER — HOSPITAL ENCOUNTER (OUTPATIENT)
Dept: GENERAL RADIOLOGY | Facility: HOSPITAL | Age: 43
Discharge: HOME | End: 2018-01-03
Attending: INTERNAL MEDICINE | Admitting: INTERNAL MEDICINE

## 2018-01-03 PROCEDURE — 74247: CPT

## 2018-01-10 ENCOUNTER — TELEPHONE (OUTPATIENT)
Dept: GASTROENTEROLOGY | Facility: CLINIC | Age: 43
End: 2018-01-10

## 2018-01-10 NOTE — TELEPHONE ENCOUNTER
Called pt and advised per Dr Kelsey that her esophagram shows an angular contour of the upper esophagus but no narrowing to obstruct passage of food.  Advised he does have a sm hiatal hernia , otherwise normal.      She recommends to continue pantoprazole. Pt verb understanding.

## 2018-01-10 NOTE — TELEPHONE ENCOUNTER
----- Message from Candice Kelsey MD sent at 1/4/2018  8:28 PM EST -----  Esophagram shows an angular contour of the upper esophagus but no narrowing to obstruct passage of food.  Small hiatal hernia.  Otherwise normal.      Recommend continuing pantoprazole

## 2018-01-18 ENCOUNTER — OFFICE VISIT (OUTPATIENT)
Dept: GASTROENTEROLOGY | Facility: CLINIC | Age: 43
End: 2018-01-18

## 2018-01-18 VITALS
BODY MASS INDEX: 30.45 KG/M2 | DIASTOLIC BLOOD PRESSURE: 80 MMHG | WEIGHT: 205.6 LBS | TEMPERATURE: 98.1 F | SYSTOLIC BLOOD PRESSURE: 108 MMHG | HEIGHT: 69 IN

## 2018-01-18 DIAGNOSIS — R10.84 GENERALIZED ABDOMINAL PAIN: Primary | ICD-10-CM

## 2018-01-18 DIAGNOSIS — K59.00 CONSTIPATION, UNSPECIFIED CONSTIPATION TYPE: ICD-10-CM

## 2018-01-18 DIAGNOSIS — R68.81 EARLY SATIETY: ICD-10-CM

## 2018-01-18 DIAGNOSIS — R14.0 BLOATING: ICD-10-CM

## 2018-01-18 PROCEDURE — 99214 OFFICE O/P EST MOD 30 MIN: CPT | Performed by: INTERNAL MEDICINE

## 2018-01-18 RX ORDER — ERGOCALCIFEROL 1.25 MG/1
CAPSULE ORAL
Refills: 0 | COMMUNITY
Start: 2017-11-10

## 2018-01-18 RX ORDER — POLYETHYLENE GLYCOL 3350 17 G/17G
17 POWDER, FOR SOLUTION ORAL DAILY
Qty: 850 G | Refills: 1 | Status: SHIPPED | OUTPATIENT
Start: 2018-01-18

## 2018-01-18 NOTE — PROGRESS NOTES
Chief Complaint   Patient presents with   • Abdominal Pain   • Dizziness     Subjective     HPI  Frankie Jerez is a 42 y.o. male who presents for follow up of abdominal pain and bloating and now a new complaint of constipation.  I initially saw him in December following what sounded like an acute episode of gastroenteritis.  He reported a similar episode for which he was hospitalized at [facility].  I was able to get the records and review them, summarized as follows -- Hospitalized at  November 10 through the 12th in 2015.  I he had profuse nausea vomiting and diarrhea that started following drinking.  CT imaging showed an inflamed colon and ileum.  He had an elevated lactate.  He was admitted to the surgery service.  He was rehydrated.  He was evaluated by the GI service.  He improved very quickly and d/c'd.  No endscopy was done and discharged diagnosis was acute GE.    He is finding that he is still having intermittent episodes of abdominal pain.  It is associated with moderately severe bloating.  His wife reports that this happens with various foods, including tomato based products, ground beef.  He has done okay with leaner cuts of meat such as chicken.  His  Abdomen will get quite distended.  It will be painful predominantly in epigastric region.  He gets early satiety.  He finds that this occurs even after eating a few bites of food.  He will try things like ginger ale and Sprite because he feels that if he has a large belch this will improve the symptoms.  However frequently he is unable to belch.  I did have him get an upper GI follow through with [facility] that was normal with a small sliding hiatal hernia and no evidence of reflux.  He also endorses a new complaint of constipation.  He feels that he feels that he needs to go have a bowel movement, then goes and sits on the commode and is unable to pass any stools.  He reports that he usually solves this problem by eating milk which frequently  makes him have a bowel movement but even this is not helping him.  He tried to increase fiber in his diet but again no improvement in results.    Past Medical History:   Diagnosis Date   • Vitamin D deficiency disease        Social History     Social History   • Marital status: Single     Spouse name: N/A   • Number of children: N/A   • Years of education: N/A     Social History Main Topics   • Smoking status: Never Smoker   • Smokeless tobacco: Never Used   • Alcohol use Yes      Comment: occasionally   • Drug use: No   • Sexual activity: Defer     Other Topics Concern   • None     Social History Narrative         Current Outpatient Prescriptions:   •  famotidine (PEPCID) 20 MG tablet, Take 1 tablet by mouth 2 (Two) Times a Day., Disp: 14 tablet, Rfl: 0  •  finasteride (PROSCAR) 5 MG tablet, , Disp: , Rfl: 5  •  ondansetron ODT (ZOFRAN-ODT) 4 MG disintegrating tablet, Take 1 tablet by mouth Every 6 (Six) Hours As Needed for Nausea or Vomiting., Disp: 20 tablet, Rfl: 0  •  pantoprazole (PROTONIX) 40 MG EC tablet, Take 1 tablet by mouth Every Morning Before Breakfast., Disp: 30 tablet, Rfl: 2  •  vitamin D (ERGOCALCIFEROL) 96052 units capsule capsule, , Disp: , Rfl: 0  •  polyethylene glycol (MIRALAX) powder, Take 17 g by mouth Daily., Disp: 850 g, Rfl: 1    Review of Systems   Constitutional: Negative for activity change, appetite change and fatigue.   HENT: Negative for sore throat and trouble swallowing.    Gastrointestinal: Positive for abdominal distention, abdominal pain and constipation. Negative for blood in stool.   Endocrine: Negative for cold intolerance and heat intolerance.   Genitourinary: Negative for difficulty urinating, dysuria and frequency.   Musculoskeletal: Negative for arthralgias, back pain and myalgias.   Hematological: Negative for adenopathy. Does not bruise/bleed easily.   All other systems reviewed and are negative.      Objective   Vitals:    01/18/18 1127   BP: 108/80   Temp: 98.1 °F  (36.7 °C)     Last Weight    01/18/18  1127   Weight: 93.3 kg (205 lb 9.6 oz)     Body mass index is 30.36 kg/(m^2).      Physical Exam   Constitutional: He is oriented to person, place, and time. He appears well-developed and well-nourished. No distress.   HENT:   Head: Normocephalic and atraumatic.   Right Ear: External ear normal.   Left Ear: External ear normal.   Nose: Nose normal.   Mouth/Throat: Oropharynx is clear and moist.   Eyes: Conjunctivae and EOM are normal. Right eye exhibits no discharge. Left eye exhibits no discharge. No scleral icterus.   Neck: Normal range of motion. Neck supple. No thyromegaly present.   No supraclavicular adenopathy   Cardiovascular: Normal rate, regular rhythm, normal heart sounds and intact distal pulses.  Exam reveals no gallop.    No murmur heard.  No lower extremity edema   Pulmonary/Chest: Effort normal and breath sounds normal. No respiratory distress. He has no wheezes.   Abdominal: Soft. Normal appearance and bowel sounds are normal. He exhibits distension. He exhibits no mass. There is no hepatosplenomegaly. There is tenderness. There is no rigidity, no rebound and no guarding. No hernia.   Moderate distention of the abdomen with some mild tenderness in epigastric region   Genitourinary:   Genitourinary Comments: Rectal exam deferred   Musculoskeletal: Normal range of motion. He exhibits no edema or tenderness.   No atrophy of upper or lower extremities.  Normal digits and nails of both hands.   Lymphadenopathy:     He has no cervical adenopathy.   Neurological: He is alert and oriented to person, place, and time. He displays no atrophy. Coordination normal.   Skin: Skin is warm and dry. No rash noted. He is not diaphoretic. No erythema.   Psychiatric: He has a normal mood and affect. His behavior is normal. Judgment and thought content normal.   Vitals reviewed.      WBC   Date Value Ref Range Status   01/01/2018 8.63 4.50 - 10.70 10*3/mm3 Final     RBC   Date Value  Ref Range Status   01/01/2018 4.46 (L) 4.60 - 6.00 10*6/mm3 Final     Hemoglobin   Date Value Ref Range Status   01/01/2018 13.7 13.7 - 17.6 g/dL Final     Hematocrit   Date Value Ref Range Status   01/01/2018 39.4 (L) 40.4 - 52.2 % Final     MCV   Date Value Ref Range Status   01/01/2018 88.3 79.8 - 96.2 fL Final     MCH   Date Value Ref Range Status   01/01/2018 30.7 27.0 - 32.7 pg Final     MCHC   Date Value Ref Range Status   01/01/2018 34.8 32.6 - 36.4 g/dL Final     RDW   Date Value Ref Range Status   01/01/2018 12.3 11.5 - 14.5 % Final     RDW-SD   Date Value Ref Range Status   01/01/2018 39.5 37.0 - 54.0 fl Final     MPV   Date Value Ref Range Status   01/01/2018 11.5 6.0 - 12.0 fL Final     Platelets   Date Value Ref Range Status   01/01/2018 234 140 - 500 10*3/mm3 Final     Neutrophil %   Date Value Ref Range Status   01/01/2018 69.6 42.7 - 76.0 % Final     Lymphocyte %   Date Value Ref Range Status   01/01/2018 19.4 (L) 19.6 - 45.3 % Final     Monocyte %   Date Value Ref Range Status   01/01/2018 8.6 5.0 - 12.0 % Final     Eosinophil %   Date Value Ref Range Status   01/01/2018 2.2 0.3 - 6.2 % Final     Basophil %   Date Value Ref Range Status   01/01/2018 0.2 0.0 - 1.5 % Final     Immature Grans %   Date Value Ref Range Status   01/01/2018 0.0 0.0 - 0.5 % Final     Neutrophils, Absolute   Date Value Ref Range Status   01/01/2018 6.01 1.90 - 8.10 10*3/mm3 Final     Lymphocytes, Absolute   Date Value Ref Range Status   01/01/2018 1.67 0.90 - 4.80 10*3/mm3 Final     Monocytes, Absolute   Date Value Ref Range Status   01/01/2018 0.74 0.20 - 1.20 10*3/mm3 Final     Eosinophils, Absolute   Date Value Ref Range Status   01/01/2018 0.19 0.00 - 0.70 10*3/mm3 Final     Basophils, Absolute   Date Value Ref Range Status   01/01/2018 0.02 0.00 - 0.20 10*3/mm3 Final     Immature Grans, Absolute   Date Value Ref Range Status   01/01/2018 0.00 0.00 - 0.03 10*3/mm3 Final       Lab Results   Component Value Date     GLUCOSE 138 (H) 01/01/2018    BUN 11 01/01/2018    CREATININE 1.12 01/01/2018    EGFRIFNONA 57 (L) 12/10/2017    EGFRIFAFRI 86 01/01/2018    BCR 9.8 01/01/2018    CO2 26.8 01/01/2018    CALCIUM 9.3 01/01/2018    ALBUMIN 4.10 01/01/2018    LABIL2 1.3 01/01/2018    AST 21 01/01/2018    ALT 43 (H) 01/01/2018       1/3/18 Upper GI   CONCLUSION: Slightly angular contour of lower cervical esophagus as it  emerges from behind the trachea, a finding of questionable significance.  Epiglottic undercoating and a tiny trace of silent upper laryngeal  penetration were demonstrated. No aspiration was seen. Small sliding  hiatal herniation of the upper stomach without detected gastroesophageal  reflux. The esophageal mucosal folds appear to be within normal limits  of thickness. No significant abnormality of mucosal contours of the  esophagus, stomach, or duodenum was seen.           Assessment/Plan    1. Abdominal pain: ? Dyspepsia vs due to bloating vs biliary as this seems to be worse with red meat    2. Bloating: intermittent episdoes of distension, contributing to the pain above.  ? dysbiosis    3. Early satiety: persists, weigh remains stable    4. Constipation: new complaint, ? Contributing to the above    Plan  -Gallbladder US and HIDA to rule out biliary cause to his pain  -Daily to twice daily miralax for constipaton  -continue pepcid  -continue diet as tolerated  -OTC gas x TID for bloating and gas  -EGD if normal gallbladder and no improvement in symptoms    Frankie was seen today for abdominal pain and dizziness.    Diagnoses and all orders for this visit:    Generalized abdominal pain  -     NM HIDA Scan With Pharmacological Intervention; Future  -     US Gallbladder; Future    Bloating  -     NM HIDA Scan With Pharmacological Intervention; Future  -     US Gallbladder; Future    Constipation, unspecified constipation type  -     polyethylene glycol (MIRALAX) powder; Take 17 g by mouth Daily.      Dictated  utilizing Dragon dictation

## 2018-01-18 NOTE — PATIENT INSTRUCTIONS
Schedule the ultrasounds    Start gas-x three times a day    Daily to twice a day miralax for constipation    For any additional questions, concerns or changes to your condition after today's office visit please contact the office at [phone].

## 2018-01-31 ENCOUNTER — OFFICE VISIT (OUTPATIENT)
Dept: INTERNAL MEDICINE | Facility: CLINIC | Age: 43
End: 2018-01-31

## 2018-01-31 VITALS
WEIGHT: 204 LBS | BODY MASS INDEX: 30.21 KG/M2 | SYSTOLIC BLOOD PRESSURE: 132 MMHG | TEMPERATURE: 97.8 F | DIASTOLIC BLOOD PRESSURE: 85 MMHG | HEART RATE: 62 BPM | HEIGHT: 69 IN | OXYGEN SATURATION: 96 %

## 2018-01-31 DIAGNOSIS — E55.9 HYPOVITAMINOSIS D: ICD-10-CM

## 2018-01-31 DIAGNOSIS — R68.81 EARLY SATIETY: ICD-10-CM

## 2018-01-31 DIAGNOSIS — R10.84 GENERALIZED ABDOMINAL PAIN: Primary | ICD-10-CM

## 2018-01-31 DIAGNOSIS — R35.1 BPH ASSOCIATED WITH NOCTURIA: ICD-10-CM

## 2018-01-31 DIAGNOSIS — K59.09 OTHER CONSTIPATION: ICD-10-CM

## 2018-01-31 DIAGNOSIS — N40.1 BPH ASSOCIATED WITH NOCTURIA: ICD-10-CM

## 2018-01-31 DIAGNOSIS — K44.9 HIATAL HERNIA: ICD-10-CM

## 2018-01-31 PROCEDURE — 99214 OFFICE O/P EST MOD 30 MIN: CPT | Performed by: INTERNAL MEDICINE

## 2018-02-01 LAB
ALBUMIN SERPL-MCNC: 4.8 G/DL (ref 3.5–5.5)
ALBUMIN/GLOB SERPL: 1.5 {RATIO} (ref 1.2–2.2)
ALP SERPL-CCNC: 61 IU/L (ref 39–117)
ALT SERPL-CCNC: 29 IU/L (ref 0–44)
APPEARANCE UR: CLEAR
AST SERPL-CCNC: 19 IU/L (ref 0–40)
BACTERIA #/AREA URNS HPF: NORMAL /[HPF]
BASOPHILS # BLD AUTO: 0.1 X10E3/UL (ref 0–0.2)
BASOPHILS NFR BLD AUTO: 1 %
BILIRUB SERPL-MCNC: 1 MG/DL (ref 0–1.2)
BILIRUB UR QL STRIP: NEGATIVE
BUN SERPL-MCNC: 15 MG/DL (ref 6–24)
BUN/CREAT SERPL: 12 (ref 9–20)
CALCIUM SERPL-MCNC: 9.7 MG/DL (ref 8.7–10.2)
CHLORIDE SERPL-SCNC: 101 MMOL/L (ref 96–106)
CO2 SERPL-SCNC: 23 MMOL/L (ref 18–29)
COLOR UR: YELLOW
CREAT SERPL-MCNC: 1.25 MG/DL (ref 0.76–1.27)
EOSINOPHIL # BLD AUTO: 0.2 X10E3/UL (ref 0–0.4)
EOSINOPHIL NFR BLD AUTO: 3 %
EPI CELLS #/AREA URNS HPF: NORMAL /HPF
ERYTHROCYTE [DISTWIDTH] IN BLOOD BY AUTOMATED COUNT: 13.2 % (ref 12.3–15.4)
GFR SERPLBLD CREATININE-BSD FMLA CKD-EPI: 70 ML/MIN/1.73
GFR SERPLBLD CREATININE-BSD FMLA CKD-EPI: 80 ML/MIN/1.73
GLOBULIN SER CALC-MCNC: 3.1 G/DL (ref 1.5–4.5)
GLUCOSE SERPL-MCNC: 90 MG/DL (ref 65–99)
GLUCOSE UR QL: NEGATIVE
HCT VFR BLD AUTO: 44 % (ref 37.5–51)
HGB BLD-MCNC: 14.8 G/DL (ref 13–17.7)
HGB UR QL STRIP: NEGATIVE
IMM GRANULOCYTES # BLD: 0 X10E3/UL (ref 0–0.1)
IMM GRANULOCYTES NFR BLD: 0 %
KETONES UR QL STRIP: NEGATIVE
LEUKOCYTE ESTERASE UR QL STRIP: NEGATIVE
LYMPHOCYTES # BLD AUTO: 2.2 X10E3/UL (ref 0.7–3.1)
LYMPHOCYTES NFR BLD AUTO: 25 %
MCH RBC QN AUTO: 30.4 PG (ref 26.6–33)
MCHC RBC AUTO-ENTMCNC: 33.6 G/DL (ref 31.5–35.7)
MCV RBC AUTO: 90 FL (ref 79–97)
MICRO URNS: (no result)
MICRO URNS: (no result)
MONOCYTES # BLD AUTO: 0.8 X10E3/UL (ref 0.1–0.9)
MONOCYTES NFR BLD AUTO: 9 %
MUCOUS THREADS URNS QL MICRO: PRESENT
NEUTROPHILS # BLD AUTO: 5.6 X10E3/UL (ref 1.4–7)
NEUTROPHILS NFR BLD AUTO: 62 %
NITRITE UR QL STRIP: NEGATIVE
PH UR STRIP: 6 [PH] (ref 5–7.5)
PLATELET # BLD AUTO: 251 X10E3/UL (ref 150–379)
POTASSIUM SERPL-SCNC: 4.4 MMOL/L (ref 3.5–5.2)
PROT SERPL-MCNC: 7.9 G/DL (ref 6–8.5)
PROT UR QL STRIP: NEGATIVE
PSA SERPL-MCNC: 0.6 NG/ML (ref 0–4)
RBC # BLD AUTO: 4.87 X10E6/UL (ref 4.14–5.8)
RBC #/AREA URNS HPF: NORMAL /HPF
SODIUM SERPL-SCNC: 141 MMOL/L (ref 134–144)
SP GR UR: 1.02 (ref 1–1.03)
T4 FREE SERPL-MCNC: 1.18 NG/DL (ref 0.82–1.77)
TSH SERPL DL<=0.005 MIU/L-ACNC: 0.52 UIU/ML (ref 0.45–4.5)
UROBILINOGEN UR STRIP-MCNC: 0.2 MG/DL (ref 0.2–1)
WBC # BLD AUTO: 8.9 X10E3/UL (ref 3.4–10.8)
WBC #/AREA URNS HPF: NORMAL /HPF

## 2018-02-11 NOTE — PROGRESS NOTES
Subjective   Frankie Jerez is a 42 y.o. male.   He is here today's new patient for me for generalized abdominal pain constipation early satiety and hiatal hernia and BPH with nocturia and hypovitaminosis D  History of Present Illness   He is here today's new patient for me for generalized abdominal pain along with constipation and early satiety and noted hiatal hernia and BPH with nocturia as well as hypovitaminosis D and abdominal pain comes and goes in general not always related with constipation but his constipation is always a problem and he also has early satiety as well with a noted hiatal hernia and he also complains of BPH with nocturia when questioned/he does not empty his bladder all the way and has to urinate frequently especially at the time or at night  The following portions of the patient's history were reviewed and updated as appropriate: allergies, current medications, past family history, past medical history, past social history, past surgical history and problem list.    Review of Systems   Constitutional: Appetite change: early satiety.   Gastrointestinal: Positive for abdominal pain (eneralized) and constipation.   Genitourinary: Positive for difficulty urinating.   All other systems reviewed and are negative.      Objective   Physical Exam   Constitutional: He is oriented to person, place, and time. He appears well-developed and well-nourished. He is cooperative.   HENT:   Head: Normocephalic and atraumatic.   Right Ear: Hearing, tympanic membrane, external ear and ear canal normal.   Left Ear: Hearing, tympanic membrane, external ear and ear canal normal.   Nose: Nose normal.   Mouth/Throat: Uvula is midline, oropharynx is clear and moist and mucous membranes are normal.   Eyes: Conjunctivae, EOM and lids are normal. Pupils are equal, round, and reactive to light.   Neck: Phonation normal. Neck supple. Carotid bruit is not present.   Cardiovascular: Normal rate, regular rhythm and normal  heart sounds.  Exam reveals no gallop and no friction rub.    No murmur heard.  Pulmonary/Chest: Effort normal and breath sounds normal. No respiratory distress.   Abdominal: Soft. Bowel sounds are normal. He exhibits no distension and no mass. There is no hepatosplenomegaly. There is tenderness (Diffuse and mild). There is no rebound and no guarding. No hernia.   Musculoskeletal: He exhibits no edema.   Neurological: He is alert and oriented to person, place, and time. Coordination and gait normal.   Skin: Skin is warm and dry.   Psychiatric: He has a normal mood and affect. His speech is normal and behavior is normal. Judgment and thought content normal.   Nursing note and vitals reviewed.      Assessment/Plan   Diagnoses and all orders for this visit:    Generalized abdominal pain  -     CBC & Differential  -     Comprehensive Metabolic Panel  -     T4, Free  -     TSH  -     Urinalysis With Microscopic - Urine, Clean Catch  -     PSA DIAGNOSTIC    Other constipation  -     CBC & Differential  -     Comprehensive Metabolic Panel  -     T4, Free  -     TSH  -     Urinalysis With Microscopic - Urine, Clean Catch  -     PSA DIAGNOSTIC    Early satiety  -     CBC & Differential  -     Comprehensive Metabolic Panel  -     T4, Free  -     TSH  -     Urinalysis With Microscopic - Urine, Clean Catch  -     PSA DIAGNOSTIC    Hiatal hernia  -     CBC & Differential  -     Comprehensive Metabolic Panel  -     T4, Free  -     TSH  -     Urinalysis With Microscopic - Urine, Clean Catch  -     PSA DIAGNOSTIC    BPH associated with nocturia  -     CBC & Differential  -     Comprehensive Metabolic Panel  -     T4, Free  -     TSH  -     Urinalysis With Microscopic - Urine, Clean Catch  -     PSA DIAGNOSTIC    Hypovitaminosis D  -     CBC & Differential  -     Comprehensive Metabolic Panel  -     T4, Free  -     TSH  -     Urinalysis With Microscopic - Urine, Clean Catch  -     PSA DIAGNOSTIC    Other orders  -     Microscopic  Examination      Generalized abdominal pain we will check labs and go from there  Hiatal hernia noted and this may be a problem for him and we will get barium swallow for further workup or at least gastroenterology workup as needed  BPH with nocturia we will check diagnostic PSA and go from there and provide medication in the form of alpha blockers including Proscar and tamsulosin as tolerated  Hypovitaminosis D noted in the past and we will check vitamin D level on a regular basis as needed  Early satiety the cervical be constipation or other gastric-related problems and we will check labs and further evaluate from that  Slow transit constipation I have provided him with Linzess samples all 3 doses and we will see which one he likes the best

## 2021-04-02 ENCOUNTER — BULK ORDERING (OUTPATIENT)
Dept: CASE MANAGEMENT | Facility: OTHER | Age: 46
End: 2021-04-02

## 2021-04-02 DIAGNOSIS — Z23 IMMUNIZATION DUE: ICD-10-CM
